# Patient Record
Sex: FEMALE | Employment: FULL TIME | URBAN - METROPOLITAN AREA
[De-identification: names, ages, dates, MRNs, and addresses within clinical notes are randomized per-mention and may not be internally consistent; named-entity substitution may affect disease eponyms.]

---

## 2024-01-12 ENCOUNTER — TELEPHONE (OUTPATIENT)
Dept: MEDICAL GROUP | Facility: IMAGING CENTER | Age: 34
End: 2024-01-12

## 2024-02-02 ENCOUNTER — TELEPHONE (OUTPATIENT)
Dept: MEDICAL GROUP | Facility: IMAGING CENTER | Age: 34
End: 2024-02-02

## 2024-02-13 ENCOUNTER — APPOINTMENT (OUTPATIENT)
Dept: MEDICAL GROUP | Facility: IMAGING CENTER | Age: 34
End: 2024-02-13

## 2024-03-12 ENCOUNTER — APPOINTMENT (OUTPATIENT)
Dept: MEDICAL GROUP | Facility: IMAGING CENTER | Age: 34
End: 2024-03-12

## 2024-04-15 ENCOUNTER — APPOINTMENT (OUTPATIENT)
Dept: MEDICAL GROUP | Facility: IMAGING CENTER | Age: 34
End: 2024-04-15
Payer: COMMERCIAL

## 2024-04-15 ENCOUNTER — OFFICE VISIT (OUTPATIENT)
Dept: MEDICAL GROUP | Facility: IMAGING CENTER | Age: 34
End: 2024-04-15
Payer: COMMERCIAL

## 2024-04-15 ENCOUNTER — HOSPITAL ENCOUNTER (OUTPATIENT)
Facility: MEDICAL CENTER | Age: 34
End: 2024-04-15
Attending: PHYSICIAN ASSISTANT
Payer: COMMERCIAL

## 2024-04-15 VITALS
RESPIRATION RATE: 16 BRPM | HEIGHT: 66 IN | BODY MASS INDEX: 20.89 KG/M2 | SYSTOLIC BLOOD PRESSURE: 110 MMHG | OXYGEN SATURATION: 97 % | HEART RATE: 84 BPM | WEIGHT: 130 LBS | DIASTOLIC BLOOD PRESSURE: 64 MMHG | TEMPERATURE: 97.8 F

## 2024-04-15 DIAGNOSIS — Z13.0 SCREENING FOR DEFICIENCY ANEMIA: ICD-10-CM

## 2024-04-15 DIAGNOSIS — Z13.1 DIABETES MELLITUS SCREENING: ICD-10-CM

## 2024-04-15 DIAGNOSIS — Z11.51 SCREENING FOR HPV (HUMAN PAPILLOMAVIRUS): ICD-10-CM

## 2024-04-15 DIAGNOSIS — Z23 NEED FOR VACCINATION: ICD-10-CM

## 2024-04-15 DIAGNOSIS — R76.8 HSV-2 SEROPOSITIVE: ICD-10-CM

## 2024-04-15 DIAGNOSIS — J34.2 DEVIATED SEPTUM: ICD-10-CM

## 2024-04-15 DIAGNOSIS — Z13.21 ENCOUNTER FOR VITAMIN DEFICIENCY SCREENING: ICD-10-CM

## 2024-04-15 DIAGNOSIS — Z13.29 SCREENING FOR THYROID DISORDER: ICD-10-CM

## 2024-04-15 DIAGNOSIS — N89.8 VAGINAL DISCHARGE: ICD-10-CM

## 2024-04-15 DIAGNOSIS — Z01.419 ENCOUNTER FOR GYNECOLOGICAL EXAMINATION: ICD-10-CM

## 2024-04-15 DIAGNOSIS — Z12.4 SCREENING FOR CERVICAL CANCER: ICD-10-CM

## 2024-04-15 DIAGNOSIS — Z12.83 SKIN CANCER SCREENING: ICD-10-CM

## 2024-04-15 DIAGNOSIS — Z01.84 IMMUNITY STATUS TESTING: ICD-10-CM

## 2024-04-15 PROBLEM — K58.9 IBS (IRRITABLE BOWEL SYNDROME): Status: ACTIVE | Noted: 2024-04-15

## 2024-04-15 PROBLEM — Z86.16 HISTORY OF 2019 NOVEL CORONAVIRUS DISEASE (COVID-19): Status: RESOLVED | Noted: 2022-03-22 | Resolved: 2024-04-15

## 2024-04-15 PROBLEM — M54.50 LOW BACK PAIN: Status: RESOLVED | Noted: 2022-05-19 | Resolved: 2024-04-15

## 2024-04-15 PROCEDURE — 90651 9VHPV VACCINE 2/3 DOSE IM: CPT | Performed by: PHYSICIAN ASSISTANT

## 2024-04-15 PROCEDURE — 3078F DIAST BP <80 MM HG: CPT | Performed by: PHYSICIAN ASSISTANT

## 2024-04-15 PROCEDURE — 99385 PREV VISIT NEW AGE 18-39: CPT | Mod: 25 | Performed by: PHYSICIAN ASSISTANT

## 2024-04-15 PROCEDURE — 87510 GARDNER VAG DNA DIR PROBE: CPT

## 2024-04-15 PROCEDURE — 87491 CHLMYD TRACH DNA AMP PROBE: CPT

## 2024-04-15 PROCEDURE — 87660 TRICHOMONAS VAGIN DIR PROBE: CPT

## 2024-04-15 PROCEDURE — 87480 CANDIDA DNA DIR PROBE: CPT

## 2024-04-15 PROCEDURE — 90471 IMMUNIZATION ADMIN: CPT | Performed by: PHYSICIAN ASSISTANT

## 2024-04-15 PROCEDURE — 88175 CYTOPATH C/V AUTO FLUID REDO: CPT

## 2024-04-15 PROCEDURE — 87591 N.GONORRHOEAE DNA AMP PROB: CPT

## 2024-04-15 PROCEDURE — 3074F SYST BP LT 130 MM HG: CPT | Performed by: PHYSICIAN ASSISTANT

## 2024-04-15 PROCEDURE — 1126F AMNT PAIN NOTED NONE PRSNT: CPT | Performed by: PHYSICIAN ASSISTANT

## 2024-04-15 RX ORDER — COPPER 313.4 MG/1
1 INTRAUTERINE DEVICE INTRAUTERINE
COMMUNITY

## 2024-04-15 ASSESSMENT — PAIN SCALES - GENERAL: PAINLEVEL: NO PAIN

## 2024-04-15 ASSESSMENT — PATIENT HEALTH QUESTIONNAIRE - PHQ9: CLINICAL INTERPRETATION OF PHQ2 SCORE: 0

## 2024-04-15 NOTE — PROGRESS NOTES
Subjective:     CC:   Chief Complaint   Patient presents with    Gynecologic Exam    Establish Care       HPI:   Mahsa Corbett is a 33 y.o. patient who presents for annual gynecological exam. She is feeling well and denies any complaints.  Has history of deviated septum, would like to establish with an ENT here in [place].  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    Ob-Gyn/ History:    Patient has GYN provider: no  /Para:  Last Pap Smear: pt unsure   H/O of abnormal pap smears: no  Current Contraceptive Method: copper IUD -needs to be removed .  Sexually active: yes  Patient's last menstrual period was 2024 (within days).  Periods: regular  Cramping: moderate    Screening  --STI Screening: UTD  --HIV Screening: UTD  --Hepatitis C Screening: UTD   --Immunizations:   HPV: did not complete as a teenager     Health Maintenance/Anticipatory Guidance:  Diet/Exercise: balanced, often forgets to eat. Skiing.   Substance Abuse: no history  Safe in Relationships: yes  Sun Protection/Sunscreen: yes  Dermatologist:due for skin check  Dentist: twice yearly appointments  Eye Doctor: declined    Immunizations: will check titers     has a past medical history of Low back pain (2022) and Syncope, vasovagal (10/23/2012).   has no past surgical history on file.  History reviewed. No pertinent family history.  Social History     Socioeconomic History    Marital status: Unknown     Spouse name: Not on file    Number of children: Not on file    Years of education: Not on file    Highest education level: Not on file   Occupational History    Not on file   Tobacco Use    Smoking status: Never    Smokeless tobacco: Never   Vaping Use    Vaping Use: Never used   Substance and Sexual Activity    Alcohol use: Not on file     Comment: occ    Drug use: Never    Sexual activity: Yes   Other Topics Concern    Not on file   Social History Narrative    Goes by Johanna Tinsley from [place]. 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 No kids.      Social Determinants of Health     Financial Resource Strain: Not on file   Food Insecurity: Not on file   Transportation Needs: Not on file   Physical Activity: Not on file   Stress: Not on file   Social Connections: Not on file   Intimate Partner Violence: Not on file   Housing Stability: Not on file     Social History     Social History Narrative    Goes by Johanna     Moved from [place]. 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 No kids.      Patient Active Problem List    Diagnosis Date Noted    IBS (irritable bowel syndrome) 04/15/2024    Deviated septum 04/15/2024    HSV-2 seropositive 04/15/2024    Ovarian cyst, right 10/23/2012     Current Outpatient Medications   Medication Sig Dispense Refill    Paragard Intrauterine Copper IUD 1 Intra Uterine Device by Intrauterine route.       No current facility-administered medications for this visit.     Allergies   Allergen Reactions    Amoxicillin      Pt states that she is immune to this medication    Pt states medication does not work for her.       Review of Systems   Constitutional: Negative for fever, chills and malaise/fatigue.    Respiratory: Negative for cough and shortness of breath.  Cardiovascular: Negative for chest pain, palpitations, or leg swelling.   Gastrointestinal: Negative for nausea, vomiting, abdominal pain and diarrhea.   Genitourinary: Negative for dysuria and hematuria.   Skin: Negative for rash.    Psychiatric/Behavioral: Negative for depression.      Objective:     /64 (BP Location: Right arm, Patient Position: Sitting, BP Cuff Size: Adult)   Pulse 84   Temp 36.6 °C (97.8 °F) (Temporal)   Resp 16   Ht 1.676 m (5' 6\")   Wt 59 kg (130 lb)   LMP 04/01/2024 (Within Days)   SpO2 97%   BMI 20.98 kg/m²   Body mass index is 20.98 kg/m².  Wt Readings from Last 4 Encounters:   04/15/24 59 kg (130 lb)       Physical Exam:  Constitutional: Well-developed and well-nourished. Not diaphoretic. No distress.   Skin: " Skin is warm and dry. No rash noted.  Head: Atraumatic without lesions.  Neck: Supple, trachea midline. Normal range of motion. No thyromegaly present. No lymphadenopathy--cervical or supraclavicular.  Cardiovascular: Regular rate and rhythm, S1 and S2 without murmur, rubs, or gallops.  Lungs: Normal inspiratory effort, CTA bilaterally, no wheezes/rhonchi/rales    Breast: Breasts examined seated and supine. No skin changes, peau d'orange or nipple retraction. No discharge. No axillary or supraclavicular adenopathy. No masses or nodularity palpable.   Abdomen: Soft, non tender, and without distention. Active bowel sounds in all four quadrants. No rebound, guarding, masses or HSM.    :Perineum and external genitalia normal without rash.   Vagina with white and thin discharge.   Cervix without visible lesions or discharge.   Bimanual exam without adnexal masses or cervical motion tenderness.    Extremities: No cyanosis, clubbing, erythema, nor edema. Distal pulses intact and symmetric.       A chaperone was offered to the patient during today's exam. Chaperone name: EHNRY ESPOSITO was present.    Assessment and Plan:     1. Encounter for gynecological examination  - Thinprep Rflx HPV ASC and above w/CTNG; Future    2. Screening for cervical cancer  - Thinprep Rflx HPV ASC and above w/CTNG; Future    3. Screening for HPV (human papillomavirus)  - Thinprep Rflx HPV ASC and above w/CTNG; Future    4. Screening for deficiency anemia  - CBC WITH DIFFERENTIAL; Future  - VITAMIN B12; Future    5. Encounter for vitamin deficiency screening  - VITAMIN B12; Future  - VITAMIN D,25 HYDROXY (DEFICIENCY); Future    6. Diabetes mellitus screening  - Comp Metabolic Panel; Future    7. Screening for thyroid disorder  - TSH WITH REFLEX TO FT4; Future    8. Immunity status testing  - HEP B SURFACE AB; Future  - HEP B SURFACE ANTIGEN; Future  - VARICELLA ZOSTER IGG AB; Future    9. HSV-2 seropositive    10. Deviated septum  - Referral to  ENT    11. Vaginal discharge  - VAGINAL PATHOGENS DNA PANEL; Future    12. Skin cancer screening  - Referral to Dermatology    13. Need for vaccination  - 9VHPV Vaccine 2-3 Dose IM      Follow-up: Return in about 15 weeks (around 7/29/2024) for Follow-up labs/tests, 2nd HPV.    Emelia Montague PA-C ([place])  Physician Assistant Certified  [facility]

## 2024-04-15 NOTE — PATIENT INSTRUCTIONS
It was a pleasure meeting with you today at [facility]!    Your medical history/records and medications were reviewed today.     UPDATE on MyChart Results: If you have blood work, and/or imaging studies, or any other test or procedure completed, you will have access to results as soon as they become available in MyChart. Recently, these results will be available for review at the same time that your provider is able to see results!    This will likely mean you will see a result before your provider has had a chance to review and discuss with you.  Some results or care notes may be hard to understand and may be serious in nature.    We look at every result and your provider will contact you to explain what they mean and discuss appropriate next steps. Please allow for at least 72 business hours for chart and result review.     We prefer that you wait for your care team to contact you with your results.  Often, your provider will discuss your results with you at your next appointment. We look forward to continuing to partner with you in your care.    Please review my practice information below:    If you have any prescription refill requests, please send them via [employer] or discuss with your provider at the start of your office visits. Please allow 3-5 business days for lab and testing review and you will be contacted via [employer] with those results, or if advised to make a follow up appointment regarding those results, then please do so.     Once resulted, your lab/test/imaging results will show up automatically in your MyChart. Please wait for my interpretation and recommendations prior to viewing your results to avoid any unnecessary confusion or misinterpretation. I will address all of the lab values that I interpret as abnormal and message you accordingly on your MyChart. I will always send you a message about your results even if they are normal. If you do not hear back from me within 5-7 business  days after completing your tests, then please send me a message on [employer] so I can obtain your results (especially if you went to an outside lab or imaging center - [facility], [facility], etc).     If you have any additional questions or concerns beyond my interpretation of your results, please make an appointment with me to discuss in further detail.    Please only use the [employer] messaging system for questions regarding your most recent appointment or if advised to use otherwise (glucose or blood pressure reporting).     If you have any new problems or concerns, you must make an appointment to discuss. This includes any referral requests, lab requests (unless advised to notify me for pre-appt labs), medication side effects, or request for medication adjustments.     Please arrive 15 minutes prior to your appointment time to complete your check-in and intake with the medical assistant.      Thank you,    Emelia Montague PA-C ([place])  Physician Assistant Certified  [facility]    -----------------------------------------------------------------    Attn: Patients of [facility]:    In an effort to continue to provide excellent and efficient care to our patients, it is vital that we continue to use our resources appropriately. With that, this is a reminder that [employer] is used for prescription refill requests, test results, virtual visits, and chart review only.     Any new questions, concerns/conditions, lab/imaging requests, medication adjustments, new prescriptions, or referral requests do require an appointment (virtually or in person), unless discussed otherwise at your most recent appointment.     Thank you for your understanding,    [facility]

## 2024-04-16 DIAGNOSIS — B96.89 BACTERIAL VAGINOSIS: ICD-10-CM

## 2024-04-16 DIAGNOSIS — N76.0 BACTERIAL VAGINOSIS: ICD-10-CM

## 2024-04-16 LAB
CANDIDA DNA VAG QL PROBE+SIG AMP: NEGATIVE
G VAGINALIS DNA VAG QL PROBE+SIG AMP: POSITIVE
T VAGINALIS DNA VAG QL PROBE+SIG AMP: NEGATIVE

## 2024-04-16 RX ORDER — METRONIDAZOLE 500 MG/1
500 TABLET ORAL 2 TIMES DAILY
Qty: 14 TABLET | Refills: 0 | Status: SHIPPED | OUTPATIENT
Start: 2024-04-16 | End: 2024-04-23

## 2024-04-18 LAB
C TRACH RRNA CVX QL NAA+PROBE: NEGATIVE
COMMENT NL11729A: NORMAL
CYTOLOGIST CVX/VAG CYTO: NORMAL
CYTOLOGY CVX/VAG DOC CYTO: NORMAL
CYTOLOGY CVX/VAG DOC THIN PREP: NORMAL
N GONORRHOEA RRNA CVX QL NAA+PROBE: NEGATIVE
NOTE NL11727A: NORMAL
OTHER STN SPEC: NORMAL
STAT OF ADQ CVX/VAG CYTO-IMP: NORMAL

## 2024-07-29 ENCOUNTER — APPOINTMENT (OUTPATIENT)
Dept: MEDICAL GROUP | Facility: IMAGING CENTER | Age: 34
End: 2024-07-29
Payer: COMMERCIAL

## 2024-07-29 ENCOUNTER — HOSPITAL ENCOUNTER (OUTPATIENT)
Dept: LAB | Facility: MEDICAL CENTER | Age: 34
End: 2024-07-29
Attending: PHYSICIAN ASSISTANT
Payer: COMMERCIAL

## 2024-07-29 ENCOUNTER — NON-PROVIDER VISIT (OUTPATIENT)
Dept: MEDICAL GROUP | Facility: IMAGING CENTER | Age: 34
End: 2024-07-29
Payer: COMMERCIAL

## 2024-07-29 DIAGNOSIS — Z13.21 ENCOUNTER FOR VITAMIN DEFICIENCY SCREENING: ICD-10-CM

## 2024-07-29 DIAGNOSIS — Z13.29 SCREENING FOR THYROID DISORDER: ICD-10-CM

## 2024-07-29 DIAGNOSIS — Z13.0 SCREENING FOR DEFICIENCY ANEMIA: ICD-10-CM

## 2024-07-29 DIAGNOSIS — Z13.1 DIABETES MELLITUS SCREENING: ICD-10-CM

## 2024-07-29 DIAGNOSIS — Z01.84 IMMUNITY STATUS TESTING: ICD-10-CM

## 2024-07-29 LAB
25(OH)D3 SERPL-MCNC: 27 NG/ML (ref 30–100)
ALBUMIN SERPL BCP-MCNC: 4.5 G/DL (ref 3.2–4.9)
ALBUMIN/GLOB SERPL: 1.4 G/DL
ALP SERPL-CCNC: 99 U/L (ref 30–99)
ALT SERPL-CCNC: 13 U/L (ref 2–50)
ANION GAP SERPL CALC-SCNC: 14 MMOL/L (ref 7–16)
AST SERPL-CCNC: 17 U/L (ref 12–45)
BASOPHILS # BLD AUTO: 0.7 % (ref 0–1.8)
BASOPHILS # BLD: 0.06 K/UL (ref 0–0.12)
BILIRUB SERPL-MCNC: 0.8 MG/DL (ref 0.1–1.5)
BUN SERPL-MCNC: 8 MG/DL (ref 8–22)
CALCIUM ALBUM COR SERPL-MCNC: 9.1 MG/DL (ref 8.5–10.5)
CALCIUM SERPL-MCNC: 9.5 MG/DL (ref 8.5–10.5)
CHLORIDE SERPL-SCNC: 103 MMOL/L (ref 96–112)
CO2 SERPL-SCNC: 25 MMOL/L (ref 20–33)
CREAT SERPL-MCNC: 0.75 MG/DL (ref 0.5–1.4)
EOSINOPHIL # BLD AUTO: 0.05 K/UL (ref 0–0.51)
EOSINOPHIL NFR BLD: 0.5 % (ref 0–6.9)
ERYTHROCYTE [DISTWIDTH] IN BLOOD BY AUTOMATED COUNT: 39.3 FL (ref 35.9–50)
FASTING STATUS PATIENT QL REPORTED: NORMAL
GFR SERPLBLD CREATININE-BSD FMLA CKD-EPI: 107 ML/MIN/1.73 M 2
GLOBULIN SER CALC-MCNC: 3.2 G/DL (ref 1.9–3.5)
GLUCOSE SERPL-MCNC: 92 MG/DL (ref 65–99)
HBV SURFACE AB SERPL IA-ACNC: ABNORMAL MIU/ML (ref 0–10)
HBV SURFACE AG SER QL: NORMAL
HCT VFR BLD AUTO: 45.3 % (ref 37–47)
HGB BLD-MCNC: 14.7 G/DL (ref 12–16)
IMM GRANULOCYTES # BLD AUTO: 0.04 K/UL (ref 0–0.11)
IMM GRANULOCYTES NFR BLD AUTO: 0.4 % (ref 0–0.9)
LYMPHOCYTES # BLD AUTO: 1.86 K/UL (ref 1–4.8)
LYMPHOCYTES NFR BLD: 20.3 % (ref 22–41)
MCH RBC QN AUTO: 28 PG (ref 27–33)
MCHC RBC AUTO-ENTMCNC: 32.5 G/DL (ref 32.2–35.5)
MCV RBC AUTO: 86.3 FL (ref 81.4–97.8)
MONOCYTES # BLD AUTO: 0.65 K/UL (ref 0–0.85)
MONOCYTES NFR BLD AUTO: 7.1 % (ref 0–13.4)
NEUTROPHILS # BLD AUTO: 6.52 K/UL (ref 1.82–7.42)
NEUTROPHILS NFR BLD: 71 % (ref 44–72)
NRBC # BLD AUTO: 0 K/UL
NRBC BLD-RTO: 0 /100 WBC (ref 0–0.2)
PLATELET # BLD AUTO: 268 K/UL (ref 164–446)
PMV BLD AUTO: 11.9 FL (ref 9–12.9)
POTASSIUM SERPL-SCNC: 4.1 MMOL/L (ref 3.6–5.5)
PROT SERPL-MCNC: 7.7 G/DL (ref 6–8.2)
RBC # BLD AUTO: 5.25 M/UL (ref 4.2–5.4)
SODIUM SERPL-SCNC: 142 MMOL/L (ref 135–145)
TSH SERPL DL<=0.005 MIU/L-ACNC: 1.66 UIU/ML (ref 0.38–5.33)
VIT B12 SERPL-MCNC: 259 PG/ML (ref 211–911)
WBC # BLD AUTO: 9.2 K/UL (ref 4.8–10.8)

## 2024-07-29 PROCEDURE — 36415 COLL VENOUS BLD VENIPUNCTURE: CPT

## 2024-07-29 PROCEDURE — 82607 VITAMIN B-12: CPT

## 2024-07-29 PROCEDURE — 82306 VITAMIN D 25 HYDROXY: CPT

## 2024-07-29 PROCEDURE — 80053 COMPREHEN METABOLIC PANEL: CPT

## 2024-07-29 PROCEDURE — 87340 HEPATITIS B SURFACE AG IA: CPT

## 2024-07-29 PROCEDURE — 86706 HEP B SURFACE ANTIBODY: CPT

## 2024-07-29 PROCEDURE — 86787 VARICELLA-ZOSTER ANTIBODY: CPT

## 2024-07-29 PROCEDURE — 85025 COMPLETE CBC W/AUTO DIFF WBC: CPT

## 2024-07-29 PROCEDURE — 84443 ASSAY THYROID STIM HORMONE: CPT

## 2024-07-31 LAB — VZV IGG SER IA-ACNC: 305.9 IV

## 2024-08-05 ENCOUNTER — APPOINTMENT (OUTPATIENT)
Dept: MEDICAL GROUP | Facility: IMAGING CENTER | Age: 34
End: 2024-08-05
Payer: COMMERCIAL

## 2024-08-05 VITALS
WEIGHT: 131 LBS | BODY MASS INDEX: 21.05 KG/M2 | HEIGHT: 66 IN | TEMPERATURE: 98.8 F | HEART RATE: 78 BPM | DIASTOLIC BLOOD PRESSURE: 66 MMHG | SYSTOLIC BLOOD PRESSURE: 102 MMHG | RESPIRATION RATE: 16 BRPM | OXYGEN SATURATION: 96 %

## 2024-08-05 DIAGNOSIS — Z13.0 SCREENING FOR DEFICIENCY ANEMIA: ICD-10-CM

## 2024-08-05 DIAGNOSIS — E55.9 VITAMIN D DEFICIENCY: ICD-10-CM

## 2024-08-05 DIAGNOSIS — E53.8 B12 DEFICIENCY: ICD-10-CM

## 2024-08-05 PROCEDURE — 1126F AMNT PAIN NOTED NONE PRSNT: CPT | Performed by: PHYSICIAN ASSISTANT

## 2024-08-05 PROCEDURE — 99213 OFFICE O/P EST LOW 20 MIN: CPT | Mod: 25 | Performed by: PHYSICIAN ASSISTANT

## 2024-08-05 PROCEDURE — 3078F DIAST BP <80 MM HG: CPT | Performed by: PHYSICIAN ASSISTANT

## 2024-08-05 PROCEDURE — 3074F SYST BP LT 130 MM HG: CPT | Performed by: PHYSICIAN ASSISTANT

## 2024-08-05 RX ORDER — UREA 10 %
500 LOTION (ML) TOPICAL DAILY
COMMUNITY

## 2024-08-05 RX ORDER — CYANOCOBALAMIN 1000 UG/ML
1000 INJECTION, SOLUTION INTRAMUSCULAR; SUBCUTANEOUS ONCE
Status: COMPLETED | OUTPATIENT
Start: 2024-08-05 | End: 2024-08-05

## 2024-08-05 RX ORDER — ERGOCALCIFEROL 1.25 MG/1
50000 CAPSULE, LIQUID FILLED ORAL
Qty: 6 CAPSULE | Refills: 0 | Status: SHIPPED | OUTPATIENT
Start: 2024-08-05

## 2024-08-05 RX ADMIN — CYANOCOBALAMIN 1000 MCG: 1000 INJECTION, SOLUTION INTRAMUSCULAR; SUBCUTANEOUS at 14:23

## 2024-08-05 ASSESSMENT — FIBROSIS 4 INDEX: FIB4 SCORE: 0.6

## 2024-08-05 ASSESSMENT — PAIN SCALES - GENERAL: PAINLEVEL: NO PAIN

## 2024-08-05 NOTE — PROGRESS NOTES
Subjective:     CC:   Chief Complaint   Patient presents with    Follow-Up     Lab results from 07/29/24       HPI:   Mahsa presents today to discuss:    B12 deficiency   Latest Reference Range & Units 07/29/24 14:29   Vitamin B12 -True Cobalamin 211 - 911 pg/mL 259     Patient eats red meat around 1 to 2 days/week.  She states that in general she tends to not eat a lot during the day.  Denies any fatigue, neuropathy.  Does not take any vitamins currently.    Vitamin D deficiency  Noted in labs, not currently on vitamin D supplementation.      Past Medical History:   Diagnosis Date    Low back pain 05/19/2022    Syncope, vasovagal 10/23/2012     History reviewed. No pertinent family history.  History reviewed. No pertinent surgical history.  Social History     Tobacco Use    Smoking status: Never    Smokeless tobacco: Never   Vaping Use    Vaping status: Never Used   Substance Use Topics    Drug use: Never     Social History     Social History Narrative    Goes by Johanna     [place] from [place]. 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 No kids.      Current Outpatient Medications Ordered in Epic   Medication Sig Dispense Refill    vitamin D2, Ergocalciferol, (DRISDOL) 1.25 MG (37370 UT) Cap capsule Take 1 Capsule by mouth every 7 days. 6 Capsule 0    cyanocobalamin (VITAMIN B-12) 500 MCG Tab Take 500 mcg by mouth every day.      Paragard Intrauterine Copper IUD 1 Intra Uterine Device by Intrauterine route.       No current [facility]-ordered facility-administered medications on file.     Amoxicillin    PMH/PSH/FH/Social history reviewed.  Vaccinations discussed.  Previous records and labs reviewed. Discussed age appropriate anticipatory guidance.    ROS: see hpi  Gen: no fevers/chills  Pulm: no sob, no cough  CV: no chest pain, no palpitations, no edema  GI: no nausea/vomiting, no diarrhea  Skin: no rash    Objective:   Exam:  /66 (BP Location: Left arm, Patient Position: Sitting, BP Cuff Size: Adult)   Pulse 78   Temp 37.1  "°C (98.8 °F) (Temporal)   Resp 16   Ht 1.676 m (5' 6\")   Wt 59.4 kg (131 lb)   LMP 07/16/2024 (Exact Date)   SpO2 96%   BMI 21.14 kg/m²    Body mass index is 21.14 kg/m².    Gen: Alert and oriented, No apparent distress.  HEENT: Head atraumatic, normocephalic. Pupils equal and round.  Neck: Neck is supple without lymphadenopathy.   Lungs: Normal effort, CTA bilaterally, no wheezes, rhonchi, or rales  CV: Regular rate and rhythm. No murmurs, rubs, or gallops.  Ext: No clubbing, cyanosis, edema. 2+ patellar DTRs.    Assessment & Plan:     34 y.o. female with the following -     1. B12 deficiency  B12 shot today, start vitamin b12 500 mcg daily, recheck labs in 6 weeks. If B12 remains low, then work-up for pernicious anemia and H.pylori.  - cyanocobalamin (Vitamin B-12) injection 1,000 mcg  - FOLATE; Future  - VITAMIN B12; Future  - IRON/TOTAL IRON BIND; Future  - FERRITIN; Future    2. Vitamin D deficiency  - VITAMIN D,25 HYDROXY (DEFICIENCY); Future  - vitamin D2, Ergocalciferol, (DRISDOL) 1.25 MG (84767 UT) Cap capsule; Take 1 Capsule by mouth every 7 days.  Dispense: 6 Capsule; Refill: 0    3. Screening for deficiency anemia  - FOLATE; Future  - VITAMIN B12; Future  - IRON/TOTAL IRON BIND; Future  - FERRITIN; Future    Return in about 7 weeks (around 9/23/2024) for Follow-up labs/tests, b12 shot.    Emelia Montague PA-C ([place])  Physician Assistant Certified  [facility]      Please note that this dictation was created using voice recognition software. I have made every reasonable attempt to correct obvious errors, but I expect that there are errors of grammar and possibly content that I did not discover before finalizing the note.  "

## 2024-08-05 NOTE — PATIENT INSTRUCTIONS
It was a pleasure meeting with you today at [facility]!    Your medical history/records and medications were reviewed today.     UPDATE on MyChart Results: If you have blood work, and/or imaging studies, or any other test or procedure completed, you will have access to results as soon as they become available in MyChart. Recently, these results will be available for review at the same time that your provider is able to see results!    This will likely mean you will see a result before your provider has had a chance to review and discuss with you.  Some results or care notes may be hard to understand and may be serious in nature.    We look at every result and your provider will contact you to explain what they mean and discuss appropriate next steps. Please allow for at least 72 business hours for chart and result review.     We prefer that you wait for your care team to contact you with your results.  Often, your provider will discuss your results with you at your next appointment. We look forward to continuing to partner with you in your care.    Please review my practice information below:    If you have any prescription refill requests, please send them via [employer] or discuss with your provider at the start of your office visits. Please allow 3-5 business days for lab and testing review and you will be contacted via [employer] with those results, or if advised to make a follow up appointment regarding those results, then please do so.     Once resulted, your lab/test/imaging results will show up automatically in your MyChart. Please wait for my interpretation and recommendations prior to viewing your results to avoid any unnecessary confusion or misinterpretation. I will address all of the lab values that I interpret as abnormal and message you accordingly on your MyChart. I will always send you a message about your results even if they are normal. If you do not hear back from me within 5-7 business  days after completing your tests, then please send me a message on [facility] so I can obtain your results (especially if you went to an outside lab or imaging center - [facility], [facility], etc).     If you have any additional questions or concerns beyond my interpretation of your results, please make an appointment with me to discuss in further detail.    Please only use the [facility] messaging system for questions regarding your most recent appointment or if advised to use otherwise (glucose or blood pressure reporting).     If you have any new problems or concerns, you must make an appointment to discuss. This includes any referral requests, lab requests (unless advised to notify me for pre-appt labs), medication side effects, or request for medication adjustments.     Please arrive 15 minutes prior to your appointment time to complete your check-in and intake with the medical assistant.      Thank you,    Emelia Montague PA-C ([place])  Physician Assistant Certified  [facility]    -----------------------------------------------------------------    Attn: Patients of [facility]:    In an effort to continue to provide excellent and efficient care to our patients, it is vital that we continue to use our resources appropriately. With that, this is a reminder that [facility] is used for prescription refill requests, test results, virtual visits, and chart review only.     Any new questions, concerns/conditions, lab/imaging requests, medication adjustments, new prescriptions, or referral requests do require an appointment (virtually or in person), unless discussed otherwise at your most recent appointment.     Thank you for your understanding,    [facility]

## 2024-08-05 NOTE — ASSESSMENT & PLAN NOTE
Latest Reference Range & Units 07/29/24 14:29   Vitamin B12 -True Cobalamin 211 - 911 pg/mL 259     Patient eats red meat around 1 to 2 days/week.  She states that in general she tends to not eat a lot during the day.  Denies any fatigue, neuropathy.  Does not take any vitamins currently.  
Noted in labs, not currently on vitamin D supplementation.  
Private car

## 2024-08-07 ENCOUNTER — APPOINTMENT (RX ONLY)
Dept: URBAN - METROPOLITAN AREA CLINIC 4 | Facility: CLINIC | Age: 34
Setting detail: DERMATOLOGY
End: 2024-08-07

## 2024-08-07 ENCOUNTER — APPOINTMENT (RX ONLY)
Dept: URBAN - METROPOLITAN AREA CLINIC 36 | Facility: CLINIC | Age: 34
Setting detail: DERMATOLOGY
End: 2024-08-07

## 2024-08-07 DIAGNOSIS — D485 NEOPLASM OF UNCERTAIN BEHAVIOR OF SKIN: ICD-10-CM

## 2024-08-07 DIAGNOSIS — D22 MELANOCYTIC NEVI: ICD-10-CM

## 2024-08-07 DIAGNOSIS — D18.0 HEMANGIOMA: ICD-10-CM

## 2024-08-07 DIAGNOSIS — Z71.89 OTHER SPECIFIED COUNSELING: ICD-10-CM

## 2024-08-07 DIAGNOSIS — L81.4 OTHER MELANIN HYPERPIGMENTATION: ICD-10-CM

## 2024-08-07 DIAGNOSIS — L82.1 OTHER SEBORRHEIC KERATOSIS: ICD-10-CM

## 2024-08-07 PROBLEM — D18.01 HEMANGIOMA OF SKIN AND SUBCUTANEOUS TISSUE: Status: ACTIVE | Noted: 2024-08-07

## 2024-08-07 PROBLEM — D22.5 MELANOCYTIC NEVI OF TRUNK: Status: ACTIVE | Noted: 2024-08-07

## 2024-08-07 PROBLEM — D48.5 NEOPLASM OF UNCERTAIN BEHAVIOR OF SKIN: Status: ACTIVE | Noted: 2024-08-07

## 2024-08-07 PROCEDURE — ? BIOPSY BY SHAVE METHOD

## 2024-08-07 PROCEDURE — 99213 OFFICE O/P EST LOW 20 MIN: CPT | Mod: 25

## 2024-08-07 PROCEDURE — ? COUNSELING

## 2024-08-07 PROCEDURE — ? SUNSCREEN RECOMMENDATIONS

## 2024-08-07 PROCEDURE — 11102 TANGNTL BX SKIN SINGLE LES: CPT

## 2024-08-07 ASSESSMENT — LOCATION ZONE DERM
LOCATION ZONE: TRUNK
LOCATION ZONE: ARM

## 2024-08-07 ASSESSMENT — LOCATION DETAILED DESCRIPTION DERM
LOCATION DETAILED: INFERIOR THORACIC SPINE
LOCATION DETAILED: LEFT POSTERIOR SHOULDER
LOCATION DETAILED: RIGHT POSTERIOR SHOULDER
LOCATION DETAILED: LEFT SUPERIOR LATERAL UPPER BACK

## 2024-08-07 ASSESSMENT — LOCATION SIMPLE DESCRIPTION DERM
LOCATION SIMPLE: LEFT SHOULDER
LOCATION SIMPLE: RIGHT SHOULDER
LOCATION SIMPLE: LEFT UPPER BACK
LOCATION SIMPLE: UPPER BACK

## 2024-08-07 NOTE — HPI: COSMETIC CONSULTATION
When Outside In The Sun, Do You...: rarely burns, mostly tans
Additional History: Pt wants to get help with her discoloration. Pt has melasma. Pt has never had laser before. 1927 Fraxel 3x 4-6 weeks apart.

## 2024-08-14 ENCOUNTER — APPOINTMENT (RX ONLY)
Dept: URBAN - METROPOLITAN AREA CLINIC 36 | Facility: CLINIC | Age: 34
Setting detail: DERMATOLOGY
End: 2024-08-14

## 2024-08-14 DIAGNOSIS — Z41.9 ENCOUNTER FOR PROCEDURE FOR PURPOSES OTHER THAN REMEDYING HEALTH STATE, UNSPECIFIED: ICD-10-CM

## 2024-08-14 PROCEDURE — ? FRAXEL

## 2024-08-14 ASSESSMENT — LOCATION SIMPLE DESCRIPTION DERM
LOCATION SIMPLE: LEFT CHEEK
LOCATION SIMPLE: RIGHT FOREHEAD
LOCATION SIMPLE: RIGHT CHEEK
LOCATION SIMPLE: LEFT FOREHEAD

## 2024-08-14 ASSESSMENT — LOCATION DETAILED DESCRIPTION DERM
LOCATION DETAILED: RIGHT MEDIAL BUCCAL CHEEK
LOCATION DETAILED: LEFT INFERIOR CENTRAL MALAR CHEEK
LOCATION DETAILED: LEFT FOREHEAD
LOCATION DETAILED: RIGHT CENTRAL MALAR CHEEK
LOCATION DETAILED: RIGHT INFERIOR FOREHEAD

## 2024-08-14 ASSESSMENT — LOCATION ZONE DERM: LOCATION ZONE: FACE

## 2024-08-14 NOTE — PROCEDURE: FRAXEL
Number Of Passes: 4
External Cooling: Alex Cryo 5
Large Metal Eye Shield Text: The ocular mucosa was anesthetized with tetracaine. Once adequate anesthesia was optained, large metal eye shields were inserted and remained in place until the procedure was completed.
Location: full face
Location: dorsal forearms
Total Coverage: 11%
Number Of Passes: 1
Treatment Level: 6
Post-Care Instructions: I reviewed with the patient in detail post-care instructions. Patient should avoid sun until area fully healed.
Small Plastic Eye Shield Text: The ocular mucosa was anesthetized with tetracaine. Once adequate anesthesia was optained, small plastic eye shields were inserted and remained in place until the procedure was completed.
Energy(Mj/Cm2): 20
Detail Level: Simple
Total Coverage: 35%
Total Coverage: 25%
Price (Use Numbers Only, No Special Characters Or $): 630.00
Location: dorsal arms
Was An Eye Shield Used?: Yes - Small (Metal)
Medium Plastic Eye Shield Text: The ocular mucosa was anesthetized with tetracaine. Once adequate anesthesia was optained, medium plastic eye shields were inserted and remained in place until the procedure was completed.
Small Metal Eye Shield Text: The ocular mucosa was anesthetized with tetracaine. Once adequate anesthesia was optained, small metal eye shields were inserted and remained in place until the procedure was completed.
Indication: photodamage
Wavelength: 1927nm
Tip: 15mm
Large Plastic Eye Shield Text: The ocular mucosa was anesthetized with tetracaine. Once adequate anesthesia was optained, large plastic eye shields were inserted and remained in place until the procedure was completed.
Location: decolletage of the chest
Medium Metal Eye Shield Text: The ocular mucosa was anesthetized with tetracaine. Once adequate anesthesia was optained, medium metal eye shields were inserted and remained in place until the procedure was completed.
Add Post-Care Below To The Note: No
Consent: Written consent obtained, risks reviewed including but not limited to pain and incomplete improvement.

## 2024-09-12 DIAGNOSIS — E55.9 VITAMIN D DEFICIENCY: ICD-10-CM

## 2024-09-12 RX ORDER — ERGOCALCIFEROL 1.25 MG/1
50000 CAPSULE, LIQUID FILLED ORAL
Qty: 6 CAPSULE | Refills: 0 | OUTPATIENT
Start: 2024-09-12

## 2024-09-12 NOTE — TELEPHONE ENCOUNTER
Received request via: Pharmacy    Was the patient seen in the last year in this department? Yes    Does the patient have an active prescription (recently filled or refills available) for medication(s) requested? No    Pharmacy Name: [id number]    Does the patient have residential Plus and need 100-day supply? (This applies to ALL medications) Patient does not have SCP

## 2024-09-18 DIAGNOSIS — E55.9 VITAMIN D DEFICIENCY: ICD-10-CM

## 2024-09-18 NOTE — TELEPHONE ENCOUNTER
Received request via: Pharmacy    Was the patient seen in the last year in this department? Yes    Does the patient have an active prescription (recently filled or refills available) for medication(s) requested? No    Pharmacy Name: [id number]    Does the patient have correction Plus and need 100-day supply? (This applies to ALL medications) Patient does not have SCP

## 2024-09-19 ENCOUNTER — OFFICE VISIT (OUTPATIENT)
Dept: MEDICAL GROUP | Facility: IMAGING CENTER | Age: 34
End: 2024-09-19
Payer: COMMERCIAL

## 2024-09-19 ENCOUNTER — HOSPITAL ENCOUNTER (OUTPATIENT)
Facility: MEDICAL CENTER | Age: 34
End: 2024-09-19
Payer: COMMERCIAL

## 2024-09-19 ENCOUNTER — HOSPITAL ENCOUNTER (OUTPATIENT)
Dept: LAB | Facility: MEDICAL CENTER | Age: 34
End: 2024-09-19
Payer: COMMERCIAL

## 2024-09-19 ENCOUNTER — HOSPITAL ENCOUNTER (OUTPATIENT)
Dept: LAB | Facility: MEDICAL CENTER | Age: 34
End: 2024-09-19
Attending: PHYSICIAN ASSISTANT
Payer: COMMERCIAL

## 2024-09-19 VITALS
HEART RATE: 99 BPM | OXYGEN SATURATION: 97 % | SYSTOLIC BLOOD PRESSURE: 110 MMHG | HEIGHT: 66 IN | WEIGHT: 131 LBS | RESPIRATION RATE: 16 BRPM | DIASTOLIC BLOOD PRESSURE: 70 MMHG | TEMPERATURE: 98 F | BODY MASS INDEX: 21.05 KG/M2

## 2024-09-19 DIAGNOSIS — E55.9 VITAMIN D DEFICIENCY: ICD-10-CM

## 2024-09-19 DIAGNOSIS — Z13.0 SCREENING FOR DEFICIENCY ANEMIA: ICD-10-CM

## 2024-09-19 DIAGNOSIS — Z11.3 SCREENING EXAMINATION FOR SEXUALLY TRANSMITTED DISEASE: ICD-10-CM

## 2024-09-19 DIAGNOSIS — N76.0 ACUTE VAGINITIS: ICD-10-CM

## 2024-09-19 DIAGNOSIS — E53.8 B12 DEFICIENCY: ICD-10-CM

## 2024-09-19 LAB
CANDIDA DNA VAG QL PROBE+SIG AMP: NEGATIVE
G VAGINALIS DNA VAG QL PROBE+SIG AMP: NEGATIVE
T VAGINALIS DNA VAG QL PROBE+SIG AMP: NEGATIVE

## 2024-09-19 PROCEDURE — 87340 HEPATITIS B SURFACE AG IA: CPT

## 2024-09-19 PROCEDURE — 87491 CHLMYD TRACH DNA AMP PROBE: CPT

## 2024-09-19 PROCEDURE — 86704 HEP B CORE ANTIBODY TOTAL: CPT

## 2024-09-19 PROCEDURE — 82607 VITAMIN B-12: CPT

## 2024-09-19 PROCEDURE — 86780 TREPONEMA PALLIDUM: CPT

## 2024-09-19 PROCEDURE — 82306 VITAMIN D 25 HYDROXY: CPT

## 2024-09-19 PROCEDURE — 87389 HIV-1 AG W/HIV-1&-2 AB AG IA: CPT

## 2024-09-19 PROCEDURE — 3074F SYST BP LT 130 MM HG: CPT

## 2024-09-19 PROCEDURE — 87591 N.GONORRHOEAE DNA AMP PROB: CPT

## 2024-09-19 PROCEDURE — 1126F AMNT PAIN NOTED NONE PRSNT: CPT

## 2024-09-19 PROCEDURE — 82746 ASSAY OF FOLIC ACID SERUM: CPT

## 2024-09-19 PROCEDURE — 86803 HEPATITIS C AB TEST: CPT

## 2024-09-19 PROCEDURE — 87510 GARDNER VAG DNA DIR PROBE: CPT

## 2024-09-19 PROCEDURE — 87480 CANDIDA DNA DIR PROBE: CPT

## 2024-09-19 PROCEDURE — 99213 OFFICE O/P EST LOW 20 MIN: CPT

## 2024-09-19 PROCEDURE — 87660 TRICHOMONAS VAGIN DIR PROBE: CPT

## 2024-09-19 PROCEDURE — 36415 COLL VENOUS BLD VENIPUNCTURE: CPT

## 2024-09-19 PROCEDURE — 83540 ASSAY OF IRON: CPT

## 2024-09-19 PROCEDURE — 82728 ASSAY OF FERRITIN: CPT

## 2024-09-19 PROCEDURE — 83550 IRON BINDING TEST: CPT

## 2024-09-19 PROCEDURE — 86706 HEP B SURFACE ANTIBODY: CPT

## 2024-09-19 PROCEDURE — 3078F DIAST BP <80 MM HG: CPT

## 2024-09-19 RX ORDER — ERGOCALCIFEROL 1.25 MG/1
50000 CAPSULE, LIQUID FILLED ORAL
Qty: 6 CAPSULE | Refills: 0 | Status: SHIPPED | OUTPATIENT
Start: 2024-09-19

## 2024-09-19 ASSESSMENT — FIBROSIS 4 INDEX: FIB4 SCORE: 0.6

## 2024-09-19 ASSESSMENT — PAIN SCALES - GENERAL: PAINLEVEL: NO PAIN

## 2024-09-19 NOTE — PROGRESS NOTES
"Subjective:     CC:   Chief Complaint   Patient presents with    Vaginal Pain     Inflamed, sore. X1week        HPI:   Mahsa presents today to discuss:    Vaginitis  Onset of symptoms started one week ago. Patient describes feeling skin irritation \"inflamed and prickly\" of labia.   She admits having unprotected sexual intercourse with a former male partner a few days before onset of her symptoms.   She denies vaginal lesions, wounds, vesicles, bleeding, drainage, or urinary symptoms.        Past Medical History:   Diagnosis Date    Low back pain 05/19/2022    Syncope, vasovagal 10/23/2012     No family history on file.  No past surgical history on file.  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      Current Outpatient Medications Ordered in Epic   Medication Sig Dispense Refill    vitamin D2, Ergocalciferol, (DRISDOL) 1.25 MG (68587 UT) Cap capsule Take 1 Capsule by mouth every 7 days. 6 Capsule 0    cyanocobalamin (VITAMIN B-12) 500 MCG Tab Take 500 mcg by mouth every day.      Paragard Intrauterine Copper IUD 1 Intra Uterine Device by Intrauterine route.       No current [facility]-ordered facility-administered medications on file.     Amoxicillin    ROS: see hpi  Gen: no fevers/chills  Pulm: no sob, no cough  CV: no chest pain, no palpitations, no edema  GI: no nausea/vomiting, no diarrhea  Skin: no rash    Objective:   Exam:  /70 (BP Location: Right arm, Patient Position: Sitting, BP Cuff Size: Adult)   Pulse 99   Temp 36.7 °C (98 °F) (Temporal)   Resp 16   Ht 1.676 m (5' 6\")   Wt 59.4 kg (131 lb)   LMP 09/08/2024 (Within Days)   SpO2 97%   BMI 21.14 kg/m²    Body mass index is 21.14 kg/m².    Gen: Alert and oriented, No apparent distress.  HEENT: Head " atraumatic, normocephalic. Pupils equal and round.  Neck: Neck is supple without lymphadenopathy.   Lungs: Normal effort, CTA bilaterally, no wheezes, rhonchi, or rales  CV: Regular rate and rhythm. No murmurs, rubs, or gallops.  ABD: +BS. Non-tender, non-distended. No rebound, rigidity, or guarding.  Ext: No clubbing, cyanosis, edema.    Assessment & Plan:     34 y.o. female with the following -     1. Acute vaginitis  New issue. Etiology unknown. Will r/o yeast vaginitis versus STI.  Safe sex practices discussed.  Avoid sexual intercourse until her symptoms have improved.  Avoid harsh soaps to vaginal area.     - VAGINAL PATHOGENS DNA PANEL; Future  - Chlamydia/GC, PCR (Genital/Anal swab); Future    2. Screening examination for sexually transmitted disease  Safe sex practices discussed.    - Chlamydia/GC, PCR (Genital/Anal swab); Future  - HIV AG/AB Combo Assay Screening; Future  - T.Pallidum AB KAUSHIK (Screening); Future  - Hepatitis C Virus Antibody; Future  - HEP B Surface Antibody; Future  - Hep B Core AB Total; Future  - Hep B Surface Antigen; Future  - Trichomonas Vaginalis by TMA    Return if symptoms worsen or fail to improve.    ROMERO Harrison   [facility]    Medical Decision Making/Course:  In the course of preparing for this visit with review of the pertinent past medical history, recent and past clinic visits, current medications, and performing chart, immunization, medical history and medication reconciliation, and in the further course of obtaining the current history pertinent to the clinic visit today, performing an exam and evaluation, ordering and independently evaluating labs, tests, and/or procedures, prescribing any recommended new medications as noted above, providing any pertinent counseling and education and recommending further coordination of care. This was discussed with patient in a shared-decision making conversation, and they understand and agreed with plan of  care.     Please note that this dictation was created using voice recognition software. I have made every reasonable attempt to correct obvious errors, but I expect that there are errors of grammar and possibly content that I did not discover before finalizing the note.

## 2024-09-20 LAB
25(OH)D3 SERPL-MCNC: 36 NG/ML (ref 30–100)
C TRACH DNA GENITAL QL NAA+PROBE: NEGATIVE
FERRITIN SERPL-MCNC: 66.9 NG/ML (ref 10–291)
FOLATE SERPL-MCNC: 11.3 NG/ML
HBV CORE AB SERPL QL IA: NONREACTIVE
HBV SURFACE AB SERPL IA-ACNC: >1000 MIU/ML (ref 0–10)
HBV SURFACE AG SER QL: NORMAL
HCV AB SER QL: NORMAL
HIV 1+2 AB+HIV1 P24 AG SERPL QL IA: NORMAL
IRON SATN MFR SERPL: 41 % (ref 15–55)
IRON SERPL-MCNC: 127 UG/DL (ref 40–170)
N GONORRHOEA DNA GENITAL QL NAA+PROBE: NEGATIVE
SPECIMEN SOURCE: NORMAL
T PALLIDUM AB SER QL IA: NORMAL
TIBC SERPL-MCNC: 311 UG/DL (ref 250–450)
UIBC SERPL-MCNC: 184 UG/DL (ref 110–370)
VIT B12 SERPL-MCNC: 349 PG/ML (ref 211–911)

## 2024-09-24 ENCOUNTER — OFFICE VISIT (OUTPATIENT)
Dept: MEDICAL GROUP | Facility: IMAGING CENTER | Age: 34
End: 2024-09-24
Payer: COMMERCIAL

## 2024-09-24 VITALS
TEMPERATURE: 98.1 F | DIASTOLIC BLOOD PRESSURE: 60 MMHG | OXYGEN SATURATION: 98 % | WEIGHT: 131 LBS | HEART RATE: 70 BPM | SYSTOLIC BLOOD PRESSURE: 102 MMHG | RESPIRATION RATE: 16 BRPM | HEIGHT: 66 IN | BODY MASS INDEX: 21.05 KG/M2

## 2024-09-24 DIAGNOSIS — E53.8 B12 DEFICIENCY: ICD-10-CM

## 2024-09-24 DIAGNOSIS — E55.9 VITAMIN D DEFICIENCY: ICD-10-CM

## 2024-09-24 PROCEDURE — 99213 OFFICE O/P EST LOW 20 MIN: CPT | Mod: 25 | Performed by: PHYSICIAN ASSISTANT

## 2024-09-24 RX ORDER — LANOLIN ALCOHOL/MO/W.PET/CERES
400 CREAM (GRAM) TOPICAL DAILY
COMMUNITY
Start: 2024-09-24

## 2024-09-24 RX ORDER — CYANOCOBALAMIN 1000 UG/ML
1000 INJECTION, SOLUTION INTRAMUSCULAR; SUBCUTANEOUS ONCE
Status: COMPLETED | OUTPATIENT
Start: 2024-09-24 | End: 2024-09-24

## 2024-09-24 RX ADMIN — CYANOCOBALAMIN 1000 MCG: 1000 INJECTION, SOLUTION INTRAMUSCULAR; SUBCUTANEOUS at 16:35

## 2024-09-24 ASSESSMENT — FIBROSIS 4 INDEX: FIB4 SCORE: 0.6

## 2024-09-24 NOTE — PROGRESS NOTES
Subjective:     CC:   Chief Complaint   Patient presents with    Lab Results     Labs from 9/19       HPI:   Mahsa presents today to discuss:    B12 deficiency  Patient with history of B12 deficiency, she has been taking a B complex for the past 6 weeks.  Eats a balanced diet with plenty of daily vegetables and red meat 1-2 times per week.  Chronic mild fatigue, no neuropathy or memory concerns.  Labs also show borderline low folic acid, normal iron studies, improved vitamin D.      Past Medical History:   Diagnosis Date    Low back pain 05/19/2022    Syncope, vasovagal 10/23/2012     History reviewed. No pertinent family history.  History reviewed. No pertinent surgical history.  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      Current Outpatient Medications Ordered in Epic   Medication Sig Dispense Refill    folic acid (FOLVITE) 400 MCG tablet Take 1 Tablet by mouth every day.      B Complex Vitamins (B COMPLEX-B12 PO) Take  by mouth.      vitamin D2, Ergocalciferol, (DRISDOL) 1.25 MG (69007 UT) Cap capsule Take 1 Capsule by mouth every 7 days. 6 Capsule 0    Paragard Intrauterine Copper IUD 1 Intra Uterine Device by Intrauterine route.       Current Facility-Administered Medications Ordered in Epic   Medication Dose Route Frequency Provider Last Rate Last Admin    cyanocobalamin (Vitamin B-12) injection 1,000 mcg  1,000 mcg Intramuscular Once          Amoxicillin    PMH/PSH/FH/Social history reviewed.  Vaccinations discussed.  Previous records and labs reviewed. Discussed age appropriate anticipatory guidance.    ROS: see hpi  Gen: no fevers/chills  Pulm: no sob, no cough  CV: no chest pain, no palpitations, no edema  GI: no nausea/vomiting, no diarrhea  Skin: no  "rash    Objective:   Exam:  /60 (BP Location: Right arm, Patient Position: Sitting, BP Cuff Size: Adult)   Pulse 70   Temp 36.7 °C (98.1 °F) (Temporal)   Resp 16   Ht 1.676 m (5' 6\")   Wt 59.4 kg (131 lb)   LMP 09/08/2024 (Within Days)   SpO2 98%   BMI 21.14 kg/m²    Body mass index is 21.14 kg/m².    Gen: Alert and oriented, No apparent distress.  HEENT: Head atraumatic, normocephalic. Pupils equal and round.  Neck: Neck is supple without lymphadenopathy.   Lungs: Normal effort, CTA bilaterally, no wheezes, rhonchi, or rales  CV: Regular rate and rhythm. No murmurs, rubs, or gallops.  Ext: No clubbing, cyanosis, edema.    Assessment & Plan:     34 y.o. female with the following -     1. B12 deficiency  Rule out malabsorption, check for H. pylori and pernicious anemia, if positive for pernicious anemia will prescribe B12 nasal spray weekly and B12 shots as needed.  - cyanocobalamin (Vitamin B-12) injection 1,000 mcg  - H.PYLORI STOOL ANTIGEN; Future  - INTRINSIC FACTOR AB; Future  - METHYLMALONIC ACID, SERUM; Future  - HOMOCYSTEINE; Future  - PARIETAL CELL ABS; Future    2. Vitamin D deficiency  Continue weekly vitamin D 50,000 units, will repeat labs in 2 to 3 months.    Return for Will notify patient to follow-up pending tests.    Emelia Montague PA-C ([place])  Physician Assistant Certified  [facility]      Please note that this dictation was created using voice recognition software. I have made every reasonable attempt to correct obvious errors, but I expect that there are errors of grammar and possibly content that I did not discover before finalizing the note.  "

## 2024-09-24 NOTE — PATIENT INSTRUCTIONS
It was a pleasure meeting with you today at [facility]!    Your medical history/records and medications were reviewed today.     UPDATE on MyChart Results: If you have blood work, and/or imaging studies, or any other test or procedure completed, you will have access to results as soon as they become available in MyChart. Recently, these results will be available for review at the same time that your provider is able to see results!    This will likely mean you will see a result before your provider has had a chance to review and discuss with you.  Some results or care notes may be hard to understand and may be serious in nature.    We look at every result and your provider will contact you to explain what they mean and discuss appropriate next steps. Please allow for at least 72 business hours for chart and result review.     We prefer that you wait for your care team to contact you with your results.  Often, your provider will discuss your results with you at your next appointment. We look forward to continuing to partner with you in your care.    Please review my practice information below:    If you have any prescription refill requests, please send them via [employer] or discuss with your provider at the start of your office visits. Please allow 3-5 business days for lab and testing review and you will be contacted via [employer] with those results, or if advised to make a follow up appointment regarding those results, then please do so.     Once resulted, your lab/test/imaging results will show up automatically in your MyChart. Please wait for my interpretation and recommendations prior to viewing your results to avoid any unnecessary confusion or misinterpretation. I will address all of the lab values that I interpret as abnormal and message you accordingly on your MyChart. I will always send you a message about your results even if they are normal. If you do not hear back from me within 5-7 business  days after completing your tests, then please send me a message on [employer] so I can obtain your results (especially if you went to an outside lab or imaging center - [facility], [facility], etc).     If you have any additional questions or concerns beyond my interpretation of your results, please make an appointment with me to discuss in further detail.    Please only use the [employer] messaging system for questions regarding your most recent appointment or if advised to use otherwise (glucose or blood pressure reporting).     If you have any new problems or concerns, you must make an appointment to discuss. This includes any referral requests, lab requests (unless advised to notify me for pre-appt labs), medication side effects, or request for medication adjustments.     Please arrive 15 minutes prior to your appointment time to complete your check-in and intake with the medical assistant.      Thank you,    Emelia Montague PA-C ([place])  Physician Assistant Certified  [facility]    -----------------------------------------------------------------    Attn: Patients of [facility]:    In an effort to continue to provide excellent and efficient care to our patients, it is vital that we continue to use our resources appropriately. With that, this is a reminder that [employer] is used for prescription refill requests, test results, virtual visits, and chart review only.     Any new questions, concerns/conditions, lab/imaging requests, medication adjustments, new prescriptions, or referral requests do require an appointment (virtually or in person), unless discussed otherwise at your most recent appointment.     Thank you for your understanding,    [facility]

## 2024-09-24 NOTE — ASSESSMENT & PLAN NOTE
Patient with history of B12 deficiency, she has been taking a B complex for the past 6 weeks.  Eats a balanced diet with plenty of daily vegetables and red meat 1-2 times per week.  Chronic mild fatigue, no neuropathy or memory concerns.  Labs also show borderline low folic acid, normal iron studies, improved vitamin D.

## 2024-10-21 ENCOUNTER — APPOINTMENT (OUTPATIENT)
Dept: MEDICAL GROUP | Facility: IMAGING CENTER | Age: 34
End: 2024-10-21
Payer: COMMERCIAL

## 2024-11-12 ENCOUNTER — APPOINTMENT (OUTPATIENT)
Dept: MEDICAL GROUP | Facility: IMAGING CENTER | Age: 34
End: 2024-11-12
Payer: COMMERCIAL

## 2024-12-09 DIAGNOSIS — E55.9 VITAMIN D DEFICIENCY: ICD-10-CM

## 2024-12-09 RX ORDER — ERGOCALCIFEROL 1.25 MG/1
50000 CAPSULE, LIQUID FILLED ORAL
Qty: 12 CAPSULE | Refills: 1 | Status: SHIPPED | OUTPATIENT
Start: 2024-12-09

## 2024-12-09 NOTE — TELEPHONE ENCOUNTER
Received request via: Patient    Was the patient seen in the last year in this department? Yes    Does the patient have an active prescription (recently filled or refills available) for medication(s) requested? No    Pharmacy Name: [id number]    Does the patient have assisted Plus and need 100-day supply? (This applies to ALL medications) Patient does not have SCP    Patient comment: Would it be possible to get more than 6 capsules? I dont own a car so it is a bit of an inconvenience to get the perscription.  The less often would be better.

## 2025-01-02 ENCOUNTER — HOSPITAL ENCOUNTER (OUTPATIENT)
Dept: LAB | Facility: MEDICAL CENTER | Age: 35
End: 2025-01-02
Attending: PHYSICIAN ASSISTANT
Payer: COMMERCIAL

## 2025-01-02 DIAGNOSIS — E53.8 B12 DEFICIENCY: ICD-10-CM

## 2025-01-02 LAB
H PYLORI AG STL QL IA: NOT DETECTED
HCYS SERPL-SCNC: 9.63 UMOL/L

## 2025-01-02 PROCEDURE — 83516 IMMUNOASSAY NONANTIBODY: CPT

## 2025-01-02 PROCEDURE — 83090 ASSAY OF HOMOCYSTEINE: CPT

## 2025-01-02 PROCEDURE — 86340 INTRINSIC FACTOR ANTIBODY: CPT

## 2025-01-02 PROCEDURE — 87338 HPYLORI STOOL AG IA: CPT

## 2025-01-02 PROCEDURE — 36415 COLL VENOUS BLD VENIPUNCTURE: CPT

## 2025-01-02 PROCEDURE — 83921 ORGANIC ACID SINGLE QUANT: CPT

## 2025-01-04 LAB
IF BLOCK AB SER QL RIA: NEGATIVE
PCA IGG SER-ACNC: 2.1 UNITS (ref 0–24.9)

## 2025-01-06 ENCOUNTER — APPOINTMENT (OUTPATIENT)
Dept: MEDICAL GROUP | Facility: IMAGING CENTER | Age: 35
End: 2025-01-06
Payer: COMMERCIAL

## 2025-01-06 VITALS
OXYGEN SATURATION: 99 % | WEIGHT: 136.6 LBS | DIASTOLIC BLOOD PRESSURE: 72 MMHG | BODY MASS INDEX: 21.95 KG/M2 | HEART RATE: 89 BPM | RESPIRATION RATE: 16 BRPM | HEIGHT: 66 IN | SYSTOLIC BLOOD PRESSURE: 112 MMHG | TEMPERATURE: 97.7 F

## 2025-01-06 DIAGNOSIS — Z23 NEED FOR VACCINATION: ICD-10-CM

## 2025-01-06 DIAGNOSIS — E55.9 VITAMIN D DEFICIENCY: ICD-10-CM

## 2025-01-06 DIAGNOSIS — Z00.00 WELLNESS EXAMINATION: ICD-10-CM

## 2025-01-06 DIAGNOSIS — N39.0 FREQUENT UTI: ICD-10-CM

## 2025-01-06 DIAGNOSIS — Z13.220 SCREENING CHOLESTEROL LEVEL: ICD-10-CM

## 2025-01-06 DIAGNOSIS — R14.0 ABDOMINAL BLOATING WITH CRAMPS: ICD-10-CM

## 2025-01-06 DIAGNOSIS — E53.8 B12 DEFICIENCY: ICD-10-CM

## 2025-01-06 DIAGNOSIS — R10.9 ABDOMINAL BLOATING WITH CRAMPS: ICD-10-CM

## 2025-01-06 DIAGNOSIS — N94.6 DYSMENORRHEA: ICD-10-CM

## 2025-01-06 DIAGNOSIS — Z97.5 IUD CONTRACEPTION: ICD-10-CM

## 2025-01-06 LAB — METHYLMALONATE SERPL-SCNC: <0.2 UMOL/L (ref 0–0.4)

## 2025-01-06 PROCEDURE — 96372 THER/PROPH/DIAG INJ SC/IM: CPT | Performed by: PHYSICIAN ASSISTANT

## 2025-01-06 PROCEDURE — 1126F AMNT PAIN NOTED NONE PRSNT: CPT | Performed by: PHYSICIAN ASSISTANT

## 2025-01-06 PROCEDURE — 90471 IMMUNIZATION ADMIN: CPT | Performed by: PHYSICIAN ASSISTANT

## 2025-01-06 PROCEDURE — 99395 PREV VISIT EST AGE 18-39: CPT | Mod: 25 | Performed by: PHYSICIAN ASSISTANT

## 2025-01-06 PROCEDURE — 90651 9VHPV VACCINE 2/3 DOSE IM: CPT | Mod: JZ | Performed by: PHYSICIAN ASSISTANT

## 2025-01-06 PROCEDURE — 3074F SYST BP LT 130 MM HG: CPT | Performed by: PHYSICIAN ASSISTANT

## 2025-01-06 RX ORDER — ERGOCALCIFEROL 1.25 MG/1
50000 CAPSULE, LIQUID FILLED ORAL
Qty: 12 CAPSULE | Refills: 1 | Status: SHIPPED | OUTPATIENT
Start: 2025-01-06

## 2025-01-06 RX ORDER — CYANOCOBALAMIN 1000 UG/ML
1000 INJECTION, SOLUTION INTRAMUSCULAR; SUBCUTANEOUS ONCE
Status: COMPLETED | OUTPATIENT
Start: 2025-01-06 | End: 2025-01-06

## 2025-01-06 RX ADMIN — CYANOCOBALAMIN 1000 MCG: 1000 INJECTION, SOLUTION INTRAMUSCULAR; SUBCUTANEOUS at 16:51

## 2025-01-06 ASSESSMENT — PATIENT HEALTH QUESTIONNAIRE - PHQ9: CLINICAL INTERPRETATION OF PHQ2 SCORE: 0

## 2025-01-06 ASSESSMENT — FIBROSIS 4 INDEX: FIB4 SCORE: 0.6

## 2025-01-06 ASSESSMENT — PAIN SCALES - GENERAL: PAINLEVEL_OUTOF10: NO PAIN

## 2025-01-07 NOTE — ASSESSMENT & PLAN NOTE
Patient states she gets at least 2 UTIs per year.  She is currently taking Uqora, but is not sure if it is helping.  States that she did Teladoc couple months ago and was given antibiotics.  No urinalysis or urine culture available for review.

## 2025-01-07 NOTE — ASSESSMENT & PLAN NOTE
Patient with history of B12 deficiency, currently on a B complex.  Pernicious anemia workup negative.  Negative H. pylori testing.  No neuropathy or fatigue.

## 2025-01-07 NOTE — PROGRESS NOTES
Subjective:     CC:    Chief Complaint   Patient presents with    Annual Exam    Lab Results     01/02/2025       HISTORY OF THE PRESENT ILLNESS: Patient is a 34 y.o. female here to discuss:    B12 deficiency  Patient with history of B12 deficiency, currently on a B complex.  Pernicious anemia workup negative.  Negative H. pylori testing.  No neuropathy or fatigue.    Dysmenorrhea  Has had copper IUD since 2014    Abdominal bloating with cramps  Patient admits to occasional abdominal bloating with cramping when she eats certain foods.  She states that she has made significant dietary changes over the past several years and followed an ADHD diet with some improvement of her symptoms.  She states that she notices that when she is on her menstrual cycle her abdominal cramping worsens.  Symptoms also worse when she eats highly fatty meal.  She denies any bowel habit changes.  No right upper quadrant pain or jaundice.    Frequent UTI  Patient states she gets at least 2 UTIs per year.  She is currently taking Uqora, but is not sure if it is helping.  States that she did Teladoc couple months ago and was given antibiotics.  No urinalysis or urine culture available for review.    Health Maintenance/Anticipatory Guidance:  Substance Abuse: no history  Safe in Relationships: yes  Sun Protection/Sunscreen: yes   Dermatologist: UTD on skin check  Dentist: twice yearly appointments  Eye Doctor: due for vision test  Seatbelts, helmets, and gun safety discussed    Cancer Screening:  Cervical Cancer: pap 4/15/24    Infectious Disease Screening:  STI/HIV screening: UTD    Immunizations: 3rd HPV today, flu shot unavailable in office    Depression Screening    Little interest or pleasure in doing things?  0 - not at all   Feeling down, depressed , or hopeless? 0 - not at all     The ASCVD Risk score (Sherice GARCÍA, et al., 2019) failed to calculate for the following reasons:    The 2019 ASCVD risk score is only valid for ages 40 to  "79    Allergies:   Amoxicillin  Current Outpatient Medications Ordered in Epic   Medication Sig Dispense Refill    Prenatal MV-Min-Fe Fum-FA-DHA (PRENATAL 1 PO) Take  by mouth.      Magnesium Bisglycinate (MAG GLYCINATE PO) Take  by mouth.      Emollient (COLLAGEN EX) Apply  topically.      vitamin D2, Ergocalciferol, (DRISDOL) 1.25 MG (25421 UT) Cap capsule Take 1 Capsule by mouth every 7 days. 12 Capsule 1    B Complex Vitamins (B COMPLEX-B12 PO) Take  by mouth.      Paragard Intrauterine Copper IUD 1 Intra Uterine Device by Intrauterine route.       No current [facility]-ordered facility-administered medications on file.     Past Medical History:   Diagnosis Date    Low back pain 05/19/2022    Syncope, vasovagal 10/23/2012     History reviewed. No pertinent surgical history.  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      History reviewed. No pertinent family history.     ROS: see hpi  Gen: no fevers/chills  Pulm: no sob, no cough  CV: no chest pain, no palpitations  GI: no nausea/vomiting, no diarrhea  Skin: no rash      Objective:     Exam: /72 (BP Location: Left arm, Patient Position: Sitting, BP Cuff Size: Adult)   Pulse 89   Temp 36.5 °C (97.7 °F) (Temporal)   Resp 16   Ht 1.676 m (5' 6\")   Wt 62 kg (136 lb 9.6 oz)   SpO2 99%  Body mass index is 22.05 kg/m².    Gen: Alert and oriented, No apparent distress.  HEENT: Head atraumatic, normocephalic. PERRLA. EOMI. B/L TMs pearly gray without erythema or bulge.  Posterior pharynx non-erythematous.  Neck: Neck is supple without lymphadenopathy.  Full range of motion.  Lungs: Normal effort, CTA bilaterally, no wheezes, rhonchi, or rales  CV: Regular rate and rhythm. No " murmurs, rubs, or gallops.  ABD: +BS. Non-tender, non-distended. No rebound, rigidity, or guarding.  Ext: No clubbing, cyanosis, edema.  2+ radial and dorsalis pedis pulses.  Neuro: CN II-XII intact, coordination intact bilaterally, no foot drop.  Skin: No rash or ulcerations.    Assessment & Plan:   34 y.o. female with the following -    1. Wellness examination  PMH/PSH/FH/Social history reviewed.  Vaccinations discussed.  Previous records and labs reviewed. Discussed age appropriate anticipatory guidance.    2. B12 deficiency  Likely due to dietary insufficiency.  Continue B12 replacement.  Repeat labs prior to next appointment.  - VITAMIN B12; Future  - FOLATE; Future  - cyanocobalamin (Vitamin B-12) injection 1,000 mcg    3. Vitamin D deficiency  - VITAMIN D,25 HYDROXY (DEFICIENCY); Future  - vitamin D2, Ergocalciferol, (DRISDOL) 1.25 MG (38429 UT) Cap capsule; Take 1 Capsule by mouth every 7 days.  Dispense: 12 Capsule; Refill: 1    4. Dysmenorrhea  Likely due to copper IUD, due for exchange.  Patient states that she will consider Mirena IUD.  - Referral to Gynecology    5. Abdominal bloating with cramps  Rule out SIBO.  H. pylori negative.  Consider abdominal ultrasound if worsens or if you develop right upper quadrant pain or nausea with eating.  - SIBO BREATH TEST; Future    6. Frequent UTI  Will send standing lab order for urinalysis and culture to the lab.  Patient to complete testing prior to starting any antibiotics.  - URINALYSIS; Future  - URINE CULTURE(NEW); Future    7. IUD contraception  - Referral to Gynecology    8. Screening cholesterol level  - Lipid Profile; Future    9. Need for vaccination  - 9VHPV Vaccine 2-3 Dose (GARDASIL 9)    Return in about 3 months (around 4/6/2025) for Pelvic exam, Follow-up labs/tests.    Emelia Montague PA-C ([place])  Physician Assistant Certified  [facility]    Please note that this dictation was created using voice recognition software. I have made every  reasonable attempt to correct obvious errors, but I expect that there are errors of grammar and possibly content that I did not discover before finalizing the note.

## 2025-01-07 NOTE — ASSESSMENT & PLAN NOTE
Patient admits to occasional abdominal bloating with cramping when she eats certain foods.  She states that she has made significant dietary changes over the past several years and followed an ADHD diet with some improvement of her symptoms.  She states that she notices that when she is on her menstrual cycle her abdominal cramping worsens.  Symptoms also worse when she eats highly fatty meal.  She denies any bowel habit changes.  No right upper quadrant pain or jaundice.

## 2025-01-07 NOTE — PATIENT INSTRUCTIONS
It was a pleasure meeting with you today at [facility]!    Your medical history/records and medications were reviewed today.     UPDATE on MyChart Results: If you have blood work, and/or imaging studies, or any other test or procedure completed, you will have access to results as soon as they become available in MyChart. Recently, these results will be available for review at the same time that your provider is able to see results!    This will likely mean you will see a result before your provider has had a chance to review and discuss with you.  Some results or care notes may be hard to understand and may be serious in nature.    We look at every result and your provider will contact you to explain what they mean and discuss appropriate next steps. Please allow for at least 72 business hours for chart and result review.     We prefer that you wait for your care team to contact you with your results.  Often, your provider will discuss your results with you at your next appointment. We look forward to continuing to partner with you in your care.    Please review my practice information below:    If you have any prescription refill requests, please send them via [employer] or discuss with your provider at the start of your office visits. Please allow 3-5 business days for lab and testing review and you will be contacted via [employer] with those results, or if advised to make a follow up appointment regarding those results, then please do so.     Once resulted, your lab/test/imaging results will show up automatically in your MyChart. Please wait for my interpretation and recommendations prior to viewing your results to avoid any unnecessary confusion or misinterpretation. I will address all of the lab values that I interpret as abnormal and message you accordingly on your MyChart. I will always send you a message about your results even if they are normal. If you do not hear back from me within 5-7 business  days after completing your tests, then please send me a message on [employer] so I can obtain your results (especially if you went to an outside lab or imaging center - [facility], [facility], etc).     If you have any additional questions or concerns beyond my interpretation of your results, please make an appointment with me to discuss in further detail.    Please only use the [employer] messaging system for questions regarding your most recent appointment or if advised to use otherwise (glucose or blood pressure reporting).     If you have any new problems or concerns, you must make an appointment to discuss. This includes any referral requests, lab requests (unless advised to notify me for pre-appt labs), medication side effects, or request for medication adjustments.     Please arrive 15 minutes prior to your appointment time to complete your check-in and intake with the medical assistant.      Thank you,    Emelia Montague PA-C ([place])  Physician Assistant Certified  [facility]    -----------------------------------------------------------------    Attn: Patients of [facility]:    In an effort to continue to provide excellent and efficient care to our patients, it is vital that we continue to use our resources appropriately. With that, this is a reminder that [employer] is used for prescription refill requests, test results, virtual visits, and chart review only.     Any new questions, concerns/conditions, lab/imaging requests, medication adjustments, new prescriptions, or referral requests do require an appointment (virtually or in person), unless discussed otherwise at your most recent appointment.     Thank you for your understanding,    [facility]

## 2025-03-22 ENCOUNTER — APPOINTMENT (OUTPATIENT)
Dept: RADIOLOGY | Facility: IMAGING CENTER | Age: 35
End: 2025-03-22
Attending: NURSE PRACTITIONER
Payer: COMMERCIAL

## 2025-03-22 ENCOUNTER — OFFICE VISIT (OUTPATIENT)
Dept: URGENT CARE | Facility: CLINIC | Age: 35
End: 2025-03-22
Payer: COMMERCIAL

## 2025-03-22 VITALS
HEIGHT: 66 IN | WEIGHT: 138 LBS | RESPIRATION RATE: 15 BRPM | HEART RATE: 81 BPM | DIASTOLIC BLOOD PRESSURE: 62 MMHG | SYSTOLIC BLOOD PRESSURE: 102 MMHG | TEMPERATURE: 98.2 F | BODY MASS INDEX: 22.18 KG/M2 | OXYGEN SATURATION: 98 %

## 2025-03-22 DIAGNOSIS — M25.532 LEFT WRIST PAIN: ICD-10-CM

## 2025-03-22 PROCEDURE — 73110 X-RAY EXAM OF WRIST: CPT | Mod: TC,LT | Performed by: RADIOLOGY

## 2025-03-22 PROCEDURE — 3078F DIAST BP <80 MM HG: CPT | Performed by: NURSE PRACTITIONER

## 2025-03-22 PROCEDURE — 99213 OFFICE O/P EST LOW 20 MIN: CPT | Performed by: NURSE PRACTITIONER

## 2025-03-22 PROCEDURE — 3074F SYST BP LT 130 MM HG: CPT | Performed by: NURSE PRACTITIONER

## 2025-03-22 ASSESSMENT — ENCOUNTER SYMPTOMS
CHILLS: 0
MUSCLE WEAKNESS: 0
NUMBNESS: 0
TINGLING: 0
FEVER: 0

## 2025-03-22 ASSESSMENT — FIBROSIS 4 INDEX: FIB4 SCORE: 0.6

## 2025-03-22 NOTE — PROGRESS NOTES
"Subjective:   Mahsa Corbett is a 34 y.o. female who presents for Wrist Injury (X 2 weeks ago)      Wrist Injury   The incident occurred more than 1 week ago. Incident location: skiing. The injury mechanism was a fall. The pain is present in the left wrist. The quality of the pain is described as aching. The pain does not radiate. The pain is moderate. The pain has been Constant since the incident. Pertinent negatives include no muscle weakness, numbness or tingling. The symptoms are aggravated by movement. She has tried nothing for the symptoms.       Review of Systems   Constitutional:  Negative for chills and fever.   Musculoskeletal:  Positive for joint pain.   Neurological:  Negative for tingling and numbness.       Medications:    B COMPLEX-B12 PO  COLLAGEN EX  MAG GLYCINATE PO  Paragard Intrauterine Copper Iud  PRENATAL 1 PO  vitamin D2 (Ergocalciferol) Caps    Allergies: Amoxicillin    Problem List: Mahsa Corbett does not have any pertinent problems on file.    Surgical History:  No past surgical history on file.    Past Social Hx: Mahsa Corbett  reports that she has never smoked. She has never used smokeless tobacco. She reports current alcohol use. She reports that she does not use drugs.     Past Family Hx:  Mahsa Corbett family history is not on file.     Problem list, medications, and allergies reviewed by myself today in Epic.     Objective:     /62   Pulse 81   Temp 36.8 °C (98.2 °F) (Temporal)   Resp 15   Ht 1.676 m (5' 6\")   Wt 62.6 kg (138 lb)   SpO2 98%   BMI 22.27 kg/m²     Physical Exam  Constitutional:       Appearance: Normal appearance. She is not ill-appearing or toxic-appearing.   HENT:      Head: Normocephalic.      Right Ear: External ear normal.      Left Ear: External ear normal.      Nose: Nose normal.      Mouth/Throat:      Lips: Pink.   Eyes:      General: Lids are normal.   Pulmonary:      Effort: Pulmonary effort is normal. " No accessory muscle usage.   Musculoskeletal:      Left wrist: Tenderness and bony tenderness present. No swelling, deformity, snuff box tenderness or crepitus. Decreased range of motion. Normal pulse.      Cervical back: Full passive range of motion without pain.   Neurological:      Mental Status: She is alert and oriented to person, place, and time.   Psychiatric:         Mood and Affect: Mood normal.         Thought Content: Thought content normal.         Assessment/Plan:     Diagnosis and associated orders:     1. Left wrist pain  DX-WRIST-COMPLETE 3+ LEFT    Referral to Orthopedics           Comments/MDM:     I independently reviewed the patient's imaging and agree with the interpretation of the radiologist.    No acute osseous abnormality       I personally reviewed prior external notes and prior test results pertinent to today's visit.  X-ray negative.  Provided a thumb spica wrist brace.  Encouraged rest, ice, referral placed to orthopedics for follow-up  Discussed management options, risks and benefits, and alternatives to treatment plan agreed upon.   Red flags discussed and indications to immediately call 911 or present to the Emergency Department.   Supportive care, differential diagnoses, and indications for immediate follow-up discussed with patient.    Patient expresses understanding and agrees to plan. Patient denies any other questions or concerns.                  Please note that this dictation was created using voice recognition software. I have made a reasonable attempt to correct obvious errors, but I expect that there are errors of grammar and possibly content that I did not discover before finalizing the note.    This note was electronically signed by Deng SÁNCHEZ.

## 2025-03-24 NOTE — Clinical Note
REFERRAL APPROVAL NOTICE         Sent on March 24, 2025                   Mahsa Corbett  [address] [facility] [address] [address]                   Dear Ms. Corbett,    After a careful review of the medical information and benefit coverage, [facility] has processed your referral. See below for additional details.    If applicable, you must be actively enrolled with your insurance for coverage of the authorized service. If you have any questions regarding your coverage, please contact your insurance directly.    REFERRAL INFORMATION   Referral #:  [id number]  Referred-To Department    Referred-By Provider:  Orthopedics    ROMERO Car[facility] (joint)      [address]  [address]  [facility] [address] [phone] [address] [place] [address] [phone]    Referral Start Date:  03/22/2025  Referral End Date:   03/22/2026             SCHEDULING  If you do not already have an appointment, please call [phone] to make an appointment.     MORE INFORMATION  If you do not already have a [employer] account, sign up at: [email or web address]  You can access your medical information, make appointments, see lab results, billing information, and more.  If you have questions regarding this referral, please contact  the [facility] Referrals department at:             [phone]. Monday - Friday 8:00AM - 5:00PM.     Sincerely,    [facility]

## 2025-04-02 ENCOUNTER — APPOINTMENT (OUTPATIENT)
Dept: MEDICAL GROUP | Facility: IMAGING CENTER | Age: 35
End: 2025-04-02
Payer: COMMERCIAL

## 2025-04-07 ENCOUNTER — HOSPITAL ENCOUNTER (OUTPATIENT)
Dept: LAB | Facility: MEDICAL CENTER | Age: 35
End: 2025-04-07
Attending: PHYSICIAN ASSISTANT
Payer: COMMERCIAL

## 2025-04-07 DIAGNOSIS — R10.9 ABDOMINAL BLOATING WITH CRAMPS: ICD-10-CM

## 2025-04-07 DIAGNOSIS — Z13.220 SCREENING CHOLESTEROL LEVEL: ICD-10-CM

## 2025-04-07 DIAGNOSIS — N39.0 FREQUENT UTI: ICD-10-CM

## 2025-04-07 DIAGNOSIS — R14.0 ABDOMINAL BLOATING WITH CRAMPS: ICD-10-CM

## 2025-04-07 DIAGNOSIS — E55.9 VITAMIN D DEFICIENCY: ICD-10-CM

## 2025-04-07 DIAGNOSIS — E53.8 B12 DEFICIENCY: ICD-10-CM

## 2025-04-07 LAB
APPEARANCE UR: CLEAR
BACTERIA #/AREA URNS HPF: NORMAL /HPF
BILIRUB UR QL STRIP.AUTO: NEGATIVE
CASTS URNS QL MICRO: NORMAL /LPF (ref 0–2)
COLOR UR: YELLOW
EPITHELIAL CELLS 1715: NORMAL /HPF (ref 0–5)
FOLATE SERPL-MCNC: 35.5 NG/ML
GLUCOSE UR STRIP.AUTO-MCNC: NEGATIVE MG/DL
KETONES UR STRIP.AUTO-MCNC: ABNORMAL MG/DL
LEUKOCYTE ESTERASE UR QL STRIP.AUTO: ABNORMAL
MICRO URNS: ABNORMAL
NITRITE UR QL STRIP.AUTO: NEGATIVE
PH UR STRIP.AUTO: 6 [PH] (ref 5–8)
PROT UR QL STRIP: NEGATIVE MG/DL
RBC # URNS HPF: NORMAL /HPF (ref 0–2)
RBC UR QL AUTO: NEGATIVE
SP GR UR STRIP.AUTO: 1.01
UROBILINOGEN UR STRIP.AUTO-MCNC: 1 EU/DL
WBC #/AREA URNS HPF: NORMAL /HPF

## 2025-04-07 PROCEDURE — 82746 ASSAY OF FOLIC ACID SERUM: CPT

## 2025-04-07 PROCEDURE — 82607 VITAMIN B-12: CPT

## 2025-04-07 PROCEDURE — 81001 URINALYSIS AUTO W/SCOPE: CPT

## 2025-04-07 PROCEDURE — 82306 VITAMIN D 25 HYDROXY: CPT

## 2025-04-07 PROCEDURE — 87077 CULTURE AEROBIC IDENTIFY: CPT

## 2025-04-07 PROCEDURE — 91065 BREATH HYDROGEN/METHANE TEST: CPT

## 2025-04-07 PROCEDURE — 80061 LIPID PANEL: CPT

## 2025-04-07 PROCEDURE — 87086 URINE CULTURE/COLONY COUNT: CPT

## 2025-04-07 PROCEDURE — 36415 COLL VENOUS BLD VENIPUNCTURE: CPT

## 2025-04-08 LAB
25(OH)D3 SERPL-MCNC: 51 NG/ML (ref 30–100)
BACTERIA UR CULT: ABNORMAL
BACTERIA UR CULT: ABNORMAL
CHOLEST SERPL-MCNC: 161 MG/DL (ref 100–199)
FASTING STATUS PATIENT QL REPORTED: NORMAL
HDLC SERPL-MCNC: 66 MG/DL
LDLC SERPL CALC-MCNC: 75 MG/DL
SIGNIFICANT IND 70042: ABNORMAL
SITE SITE: ABNORMAL
SOURCE SOURCE: ABNORMAL
TRIGL SERPL-MCNC: 102 MG/DL (ref 0–149)
VIT B12 SERPL-MCNC: 449 PG/ML (ref 211–911)

## 2025-04-09 ENCOUNTER — RESULTS FOLLOW-UP (OUTPATIENT)
Dept: MEDICAL GROUP | Facility: IMAGING CENTER | Age: 35
End: 2025-04-09
Payer: COMMERCIAL

## 2025-04-09 DIAGNOSIS — N30.00 ACUTE CYSTITIS WITHOUT HEMATURIA: ICD-10-CM

## 2025-04-09 DIAGNOSIS — K63.8219 SMALL INTESTINAL BACTERIAL OVERGROWTH (SIBO): ICD-10-CM

## 2025-04-09 RX ORDER — CEPHALEXIN 500 MG/1
500 CAPSULE ORAL 2 TIMES DAILY
Qty: 10 CAPSULE | Refills: 0 | Status: SHIPPED | OUTPATIENT
Start: 2025-04-09 | End: 2025-04-28

## 2025-04-17 LAB — TEST NAME 95000: NORMAL

## 2025-04-21 ENCOUNTER — TELEPHONE (OUTPATIENT)
Dept: MEDICAL GROUP | Facility: IMAGING CENTER | Age: 35
End: 2025-04-21
Payer: COMMERCIAL

## 2025-04-21 DIAGNOSIS — E55.9 VITAMIN D DEFICIENCY: ICD-10-CM

## 2025-04-21 RX ORDER — ERGOCALCIFEROL 1.25 MG/1
50000 CAPSULE, LIQUID FILLED ORAL
Qty: 12 CAPSULE | Refills: 0 | Status: SHIPPED | OUTPATIENT
Start: 2025-04-21 | End: 2025-04-28 | Stop reason: SDUPTHER

## 2025-04-21 RX ORDER — NEOMYCIN SULFATE 500 MG/1
500 TABLET ORAL 2 TIMES DAILY
Qty: 28 TABLET | Refills: 0 | Status: SHIPPED | OUTPATIENT
Start: 2025-04-21 | End: 2025-05-05

## 2025-04-21 NOTE — TELEPHONE ENCOUNTER
Received request via: Pharmacy    Was the patient seen in the last year in this department? Yes    Does the patient have an active prescription (recently filled or refills available) for medication(s) requested? Yes. Refill request has been refused in Epic. Contacted pharmacy and called in most recent prescription.    Pharmacy Name: [facility]    Does the patient have [facility] Plus and need 100-day supply? (This applies to ALL medications) Patient does not have SCP

## 2025-04-22 NOTE — TELEPHONE ENCOUNTER
Patient came in, she thought she had an appointment with Emelia Montague. She wanted to see her before she was out of town for the next five months. Emelia had no appointments available the whole week, she was completely booked. I had spoken to Emelia and she said that she would give her a call afterwards.

## 2025-04-28 ENCOUNTER — OFFICE VISIT (OUTPATIENT)
Dept: MEDICAL GROUP | Facility: IMAGING CENTER | Age: 35
End: 2025-04-28
Payer: COMMERCIAL

## 2025-04-28 VITALS
HEIGHT: 66 IN | RESPIRATION RATE: 16 BRPM | WEIGHT: 138 LBS | DIASTOLIC BLOOD PRESSURE: 64 MMHG | HEART RATE: 80 BPM | TEMPERATURE: 97.8 F | OXYGEN SATURATION: 98 % | SYSTOLIC BLOOD PRESSURE: 110 MMHG | BODY MASS INDEX: 22.18 KG/M2

## 2025-04-28 DIAGNOSIS — E55.9 VITAMIN D DEFICIENCY: ICD-10-CM

## 2025-04-28 DIAGNOSIS — E53.8 B12 DEFICIENCY: ICD-10-CM

## 2025-04-28 DIAGNOSIS — Z87.440 HISTORY OF UTI: ICD-10-CM

## 2025-04-28 DIAGNOSIS — K63.8219 SMALL INTESTINAL BACTERIAL OVERGROWTH (SIBO): ICD-10-CM

## 2025-04-28 RX ORDER — CYANOCOBALAMIN 1000 UG/ML
1000 INJECTION, SOLUTION INTRAMUSCULAR; SUBCUTANEOUS ONCE
Status: COMPLETED | OUTPATIENT
Start: 2025-04-28 | End: 2025-04-28

## 2025-04-28 RX ORDER — CEPHALEXIN 500 MG/1
500 CAPSULE ORAL 2 TIMES DAILY
Qty: 10 CAPSULE | Refills: 0 | Status: SHIPPED | OUTPATIENT
Start: 2025-04-28

## 2025-04-28 RX ORDER — ERGOCALCIFEROL 1.25 MG/1
50000 CAPSULE, LIQUID FILLED ORAL
Qty: 4 CAPSULE | Refills: 0 | Status: SHIPPED | OUTPATIENT
Start: 2025-04-28

## 2025-04-28 RX ADMIN — CYANOCOBALAMIN 1000 MCG: 1000 INJECTION, SOLUTION INTRAMUSCULAR; SUBCUTANEOUS at 17:03

## 2025-04-28 ASSESSMENT — PAIN SCALES - GENERAL: PAINLEVEL_OUTOF10: NO PAIN

## 2025-04-28 ASSESSMENT — FIBROSIS 4 INDEX: FIB4 SCORE: 0.62

## 2025-04-28 NOTE — PATIENT INSTRUCTIONS
It was a pleasure meeting with you today at [facility]!    Your medical history/records and medications were reviewed today.     UPDATE on MyChart Results: If you have blood work, and/or imaging studies, or any other test or procedure completed, you will have access to results as soon as they become available in MyChart. Recently, these results will be available for review at the same time that your provider is able to see results!    This will likely mean you will see a result before your provider has had a chance to review and discuss with you.  Some results or care notes may be hard to understand and may be serious in nature.    We look at every result and your provider will contact you to explain what they mean and discuss appropriate next steps. Please allow for at least 72 business hours for chart and result review.     We prefer that you wait for your care team to contact you with your results.  Often, your provider will discuss your results with you at your next appointment. We look forward to continuing to partner with you in your care.    Please review my practice information below:    If you have any prescription refill requests, please send them via [employer] or discuss with your provider at the start of your office visits. Please allow 3-5 business days for lab and testing review and you will be contacted via [employer] with those results, or if advised to make a follow up appointment regarding those results, then please do so.     Once resulted, your lab/test/imaging results will show up automatically in your MyChart. Please wait for my interpretation and recommendations prior to viewing your results to avoid any unnecessary confusion or misinterpretation. I will address all of the lab values that I interpret as abnormal and message you accordingly on your MyChart. I will always send you a message about your results even if they are normal. If you do not hear back from me within 5-7 business  days after completing your tests, then please send me a message on [employer] so I can obtain your results (especially if you went to an outside lab or imaging center - [facility], [facility], etc).     If you have any additional questions or concerns beyond my interpretation of your results, please make an appointment with me to discuss in further detail.    Please only use the [employer] messaging system for questions regarding your most recent appointment or if advised to use otherwise (glucose or blood pressure reporting).     If you have any new problems or concerns, you must make an appointment to discuss. This includes any referral requests, lab requests (unless advised to notify me for pre-appt labs), medication side effects, or request for medication adjustments.     Please arrive 15 minutes prior to your appointment time to complete your check-in and intake with the medical assistant.      Thank you,    Emelia Montague PA-C ([place])  Physician Assistant Certified  [facility]    -----------------------------------------------------------------    Attn: Patients of [facility]:    In an effort to continue to provide excellent and efficient care to our patients, it is vital that we continue to use our resources appropriately. With that, this is a reminder that [employer] is used for prescription refill requests, test results, virtual visits, and chart review only.     Any new questions, concerns/conditions, lab/imaging requests, medication adjustments, new prescriptions, or referral requests do require an appointment (virtually or in person), unless discussed otherwise at your most recent appointment.     Thank you for your understanding,    [facility]

## 2025-04-29 NOTE — PROGRESS NOTES
Subjective:     CC:   Chief Complaint   Patient presents with    Follow-Up     On SIBO results     Injections     b12       HPI:   Mahsa presents today to discuss:    Small intestinal bacterial overgrowth (SIBO)  Patient tested positive for methane and hydrogen related SIBO.  She has a history of abdominal bloating with cramping.  No diarrhea or constipation.  She was prescribed neomycin and Xifaxan, has not started those medications yet.  Does have a history of recurrent UTIs with frequent antibiotic use.  Most recently on Keflex.  No current UTI symptoms.    B12 deficiency  Patient with history of B12 deficiency, negative pernicious anemia testing, positive SIBO testing.  Requesting B12 injection today.    Of note, patient will be working abroad in [place] and living there for 5 months.     Past Medical History:   Diagnosis Date    Low back pain 05/19/2022    Syncope, vasovagal 10/23/2012     History reviewed. No pertinent family history.  History reviewed. No pertinent surgical history.  Social History     Tobacco Use    Smoking status: Never    Smokeless tobacco: Never   Vaping Use    Vaping status: Never Used   Substance Use Topics    Alcohol use: Yes     Comment: occ    Drug use: Never     Social History     Social History Narrative    Goes by [employer] from [place]. 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 No kids.      Current Outpatient Medications Ordered in Epic   Medication Sig Dispense Refill    riFAXIMin (XIFAXAN) 550 MG Tab tablet Take 1 Tablet by mouth 3 times a day for 14 days. 42 Tablet 0    cephALEXin (KEFLEX) 500 MG Cap Take 1 Capsule by mouth 2 times a day. 10 Capsule 0    vitamin D2, Ergocalciferol, (DRISDOL) 1.25 MG (15282 UT) Cap capsule Take 1 Capsule by mouth every 7 days. 4 Capsule 0    neomycin (MYCIFRADIN) 500 MG Tab Take 1 Tablet by mouth 2 times a day for 14 days. 28 Tablet 0    Prenatal MV-Min-Fe Fum-FA-DHA (PRENATAL 1 PO) Take  by mouth.      Magnesium Bisglycinate (MAG GLYCINATE PO)  "Take  by mouth.      Emollient (COLLAGEN EX) Apply  topically.      B Complex Vitamins (B COMPLEX-B12 PO) Take  by mouth.      Paragard Intrauterine Copper IUD 1 Intra Uterine Device by Intrauterine route.       No current [facility]-ordered facility-administered medications on file.     Amoxicillin    PMH/PSH/FH/Social history reviewed.  Vaccinations discussed.  Previous records and labs reviewed. Discussed age appropriate anticipatory guidance.    ROS: see hpi  Gen: no fevers/chills  Pulm: no sob, no cough  CV: no chest pain, no palpitations, no edema  GI: no nausea/vomiting, no diarrhea  Skin: no rash    Objective:   Exam:  /64 (BP Location: Right arm, Patient Position: Sitting, BP Cuff Size: Adult)   Pulse 80   Temp 36.6 °C (97.8 °F) (Temporal)   Resp 16   Ht 1.676 m (5' 6\")   Wt 62.6 kg (138 lb)   LMP 04/09/2025 (Within Days)   SpO2 98%   BMI 22.27 kg/m²    Body mass index is 22.27 kg/m².    Gen: Alert and oriented, No apparent distress.  HEENT: Head atraumatic, normocephalic. Pupils equal and round.  Neck: Neck is supple without lymphadenopathy.   Lungs: Normal effort, CTA bilaterally, no wheezes, rhonchi, or rales  CV: Regular rate and rhythm. No murmurs, rubs, or gallops.  ABD: +BS. Non-tender, non-distended. No rebound, rigidity, or guarding.  Ext: No clubbing, cyanosis, edema.    Assessment & Plan:     35 y.o. female with the following -     1. Small intestinal bacterial overgrowth (SIBO)  Advised patient to start Xifaxan and neomycin together.  Potential side effects may include diarrhea.  Increase intake of plant fiber including fruits, vegetables, fermented foods.  - riFAXIMin (XIFAXAN) 550 MG Tab tablet; Take 1 Tablet by mouth 3 times a day for 14 days.  Dispense: 42 Tablet; Refill: 0    2. History of UTI  Will give as needed prescription for Keflex if UTI symptoms develop during travel abroad.  - URINALYSIS; Future  - URINE CULTURE(NEW); Future  - cephALEXin (KEFLEX) 500 MG Cap; Take 1 Capsule " by mouth 2 times a day.  Dispense: 10 Capsule; Refill: 0    3. B12 deficiency  - cyanocobalamin (Vitamin B-12) injection 1,000 mcg    4. Vitamin D deficiency  - vitamin D2, Ergocalciferol, (DRISDOL) 1.25 MG (53152 UT) Cap capsule; Take 1 Capsule by mouth every 7 days.  Dispense: 4 Capsule; Refill: 0    Return if symptoms worsen or fail to improve.    Emelia Villarreal) Eddi SALAZAR  Physician Assistant Certified  [facility]      Please note that this dictation was created using voice recognition software. I have made every reasonable attempt to correct obvious errors, but I expect that there are errors of grammar and possibly content that I did not discover before finalizing the note.

## 2025-04-29 NOTE — ASSESSMENT & PLAN NOTE
Patient with history of B12 deficiency, negative pernicious anemia testing, positive SIBO testing.  Requesting B12 injection today.

## 2025-04-29 NOTE — ASSESSMENT & PLAN NOTE
Patient tested positive for methane and hydrogen related SIBO.  She has a history of abdominal bloating with cramping.  No diarrhea or constipation.  She was prescribed neomycin and Xifaxan, has not started those medications yet.  Does have a history of recurrent UTIs with frequent antibiotic use.  Most recently on Keflex.  No current UTI symptoms.